# Patient Record
Sex: FEMALE | Race: WHITE
[De-identification: names, ages, dates, MRNs, and addresses within clinical notes are randomized per-mention and may not be internally consistent; named-entity substitution may affect disease eponyms.]

---

## 2018-11-12 ENCOUNTER — HOSPITAL ENCOUNTER (EMERGENCY)
Dept: HOSPITAL 59 - ER | Age: 43
Discharge: HOME | End: 2018-11-12
Payer: COMMERCIAL

## 2018-11-12 DIAGNOSIS — I10: ICD-10-CM

## 2018-11-12 DIAGNOSIS — M62.830: Primary | ICD-10-CM

## 2018-11-12 DIAGNOSIS — Z87.442: ICD-10-CM

## 2018-11-12 DIAGNOSIS — Z87.891: ICD-10-CM

## 2018-11-12 LAB
ANION GAP SERPL CALC-SCNC: 14 MMOL/L (ref 7–16)
APPEARANCE UR: CLEAR
BACTERIA #/AREA URNS HPF: (no result) /[HPF]
BASOPHILS NFR BLD: 0.2 % (ref 0–6)
BILIRUB UR-MCNC: NEGATIVE MG/DL
BUN SERPL-MCNC: 14 MG/DL (ref 6–20)
CO2 SERPL-SCNC: 24 MMOL/L (ref 22–29)
COLOR UR: YELLOW
CREAT SERPL-MCNC: 0.7 MG/DL (ref 0.5–0.9)
EOSINOPHIL NFR BLD: 2.2 % (ref 0–6)
EPI CELLS #/AREA URNS HPF: >50 /[HPF]
ERYTHROCYTE [DISTWIDTH] IN BLOOD BY AUTOMATED COUNT: 13.6 % (ref 11.5–14.5)
EST GLOMERULAR FILTRATION RATE: > 60 ML/MIN
GLUCOSE SERPL-MCNC: 103 MG/DL (ref 74–109)
GLUCOSE UR STRIP-MCNC: NEGATIVE MG/DL
GRANULOCYTES NFR BLD: 75.8 % (ref 47–80)
HCG,QUALITATIVE URINE: NEGATIVE
HCT VFR BLD CALC: 41.3 % (ref 35–47)
HGB BLD-MCNC: 13.8 GM/DL (ref 11.6–16)
KETONES UR QL STRIP: NEGATIVE
LYMPHOCYTES NFR BLD AUTO: 16.3 % (ref 16–45)
MCH RBC QN AUTO: 29.4 PG (ref 27–33)
MCHC RBC AUTO-ENTMCNC: 33.4 G/DL (ref 32–36)
MCV RBC AUTO: 88.1 FL (ref 81–97)
MONOCYTES NFR BLD: 5.5 % (ref 0–9)
NITRITE UR QL STRIP: NEGATIVE
PLATELET # BLD: 280 K/UL (ref 130–400)
PMV BLD AUTO: 10.6 FL (ref 7.4–10.4)
PROT UR QL STRIP: (no result)
RBC # BLD AUTO: 4.69 M/UL (ref 3.8–5.4)
RBC # UR STRIP: (no result) /UL
RBC #/AREA URNS HPF: (no result) /[HPF]
URINE LEUKOCYTE ESTERASE: (no result)
UROBILINOGEN UR STRIP-ACNC: 0.2 E.U./DL (ref 0.2–1)
WBC # BLD AUTO: 8.7 K/UL (ref 4.2–12.2)
WBC #/AREA URNS HPF: (no result) /[HPF]

## 2018-11-12 PROCEDURE — 81025 URINE PREGNANCY TEST: CPT

## 2018-11-12 PROCEDURE — 99283 EMERGENCY DEPT VISIT LOW MDM: CPT

## 2018-11-12 PROCEDURE — 96372 THER/PROPH/DIAG INJ SC/IM: CPT

## 2018-11-12 PROCEDURE — 74176 CT ABD & PELVIS W/O CONTRAST: CPT

## 2018-11-12 PROCEDURE — 85025 COMPLETE CBC W/AUTO DIFF WBC: CPT

## 2018-11-12 PROCEDURE — 99284 EMERGENCY DEPT VISIT MOD MDM: CPT

## 2018-11-12 PROCEDURE — 80048 BASIC METABOLIC PNL TOTAL CA: CPT

## 2018-11-12 PROCEDURE — 81001 URINALYSIS AUTO W/SCOPE: CPT

## 2018-11-12 NOTE — EMERGENCY DEPARTMENT RECORD
History of Present Illness





- General


Chief Complaint: Back Pain/Injury


Stated Complaint: BACK SPASMS


Time Seen by Provider: 18 07:23


Source: Patient


Mode of Arrival: Ambulatory


Limitations: No limitations





- History of Present Illness


Initial Comments: 


The patient is here due to a one week hx of L flank pain and spasms. The pain 

was mild for the first 6 days but got a lot worse yesterday. The pain is 

located in the L mid back at the lower thoracic area. It becomes much worse 

with any twisting or bending. There is no radiation of the pain down the legs 

and no leg weakness, numbness, bowel or bladder issues, AP, fever, or 

hematuria. The patient has had similar issues in the past but not as bad.





MD Complaint: Back pain


Onset/Timin


-: Week(s)


Similar Symptoms Previously: Yes


Place: Home


Radiation: None


Severity: Severe


Severity scale (1-10): 10


Quality: Sharp


Consistency: Constant


Improves With: None


Worsens With: Supine


Context: Unknown


Associated Symptoms: Denies other symptoms


Treatments Prior to Arrival: NSAIDS





- Related Data


 Home Medications











 Medication  Instructions  Recorded  Confirmed  Last Taken


 


Gabapentin [Neurontin] 100 mg PO QHS 18 Unknown


 


Hydrochlorothiazide [Hctz 12.5MG] 12.5 mg PO DAILY 18 Unknown


 


Levothyroxine Sodium [Synthroid] 125 mcg PO DAILY 18 Unknown








 Previous Rx's











 Medication  Instructions  Recorded


 


Cyclobenzaprine HCl [Flexeril] 10 mg PO TID PRN #20 tablet 18


 


Naproxen [Naprosyn] 500 mg PO BID #14 tablet. 18











 Allergies











Allergy/AdvReac Type Severity Reaction Status Date / Time


 


No Known Drug Allergies Allergy   Verified 18 07:25














Travel Screening





- Travel/Exposure Within Last 30 Days


Have you traveled within the last 30 days?: No





Review of Systems


Constitutional: Denies: Chills, Fever


Eyes: Denies: Eye discharge


ENT: Denies: Congestion


Respiratory: Denies: Cough, Dyspnea





Past Medical History





- SOCIAL HISTORY


Smoking Status: Former smoker


Alcohol Use: None


Drug Use: None





- RESPIRATORY


Hx Respiratory Disorders: No





- CARDIOVASCULAR


Hx Cardio Disorders: No





- NEURO


Hx Neuro Disorders: No





- GI


Hx GI Disorders: No





- 


Hx Genitourinary Disorders: Yes


Hx Kidney Stones: Yes





- ENDOCRINE


Hx Endocrine Disorders: Yes


Hx Thyroid Disease: Yes





- PSYCH


Hx Psych Problems: No





Family Medical History


Any Significant Family History?: No





Physical Exam





- General


General Appearance: Alert, Oriented x3, Cooperative, No acute distress





- Head


Head exam: Atraumatic, Normocephalic, Normal inspection





- Eye


Eye exam: Normal appearance, PERRL





- Neck


Neck exam: Normal inspection, Full ROM.  negative: Tenderness





- Respiratory


Respiratory exam: Normal lung sounds bilaterally.  negative: Respiratory 

distress





- Cardiovascular


Cardiovascular Exam: Regular rate, Normal rhythm, Normal heart sounds





- GI/Abdominal


GI/Abdominal exam: Soft, Normal bowel sounds.  negative: Tenderness





- Extremities


Extremities exam: Normal inspection, Full ROM, Normal capillary refill.  

negative: Tenderness





- Back


Back exam: Reports: Normal inspection, Muscle spasm, Paraspinal tenderness.  

Denies: Vertebral tenderness


Image of Body Front/Back: 


  __________________________














  __________________________





 1 - Area of pain and tenderness.








- Neurological


Neurological exam: Alert, Normal gait, Oriented X3.  negative: Abnormal gait, 

Altered, Motor sensory deficit





- Skin


Skin exam: negative: Rash





Course





 Vital Signs











  18





  07:19


 


Temperature 97.4 F L


 


Pulse Rate 103 H


 


Respiratory 18





Rate 


 


Blood Pressure 156/101


 


Pulse Ox 97














- Reevaluation(s)


Reevaluation #1: 


The patient is doing much better at this time. She is ready for home and will 

rest when possible. I did discuss the lab and CT results with her.


18 09:38








Medical Decision Making





- Data Complexity


MDM Data: Labs Ordered and/or Reviewed, X-Ray Ordered and/or Reviewed





- Lab Data


Result diagrams: 


 18 07:43





 18 07:43





- Radiology Data


Radiology results: Report reviewed (CT: Neg for stones or hydro.)





Disposition


Disposition: Discharge


Clinical Impression: 


 Muscle spasm





Disposition: Home, Self-Care


Condition: (2) Stable


Instructions:  Muscle Spasm (ED)


Additional Instructions: 


Please take the Norco and Flexeril as needed and also the Norco when not 

driving or working. Please see your family doctor for recheck in 2-3 days if 

not better and return to the ER for any worsening symptoms.


Prescriptions: 


Cyclobenzaprine HCl [Flexeril] 10 mg PO TID PRN #20 tablet


 PRN Reason: Pain


Naproxen [Naprosyn] 500 mg PO BID #14 tablet.dr


Forms:  Patient Portal Access


Time of Disposition: 09:40





Quality





- Quality Measures


Quality Measures: N/A





- Blood Pressure Screening


View Details: Yes


Does Patient Have Any of the Following: Active Dx of HTN


Blood Pressure Classification: Hypertensive Reading


Systolic Measurement: 156


Diastolic Measurement: 101


Screening for High Blood Pressure: Patient Exclusion, Hx of HTN []

## 2018-11-13 NOTE — CT SCAN REPORT
EXAM:  CT OF THE ABDOMEN AND PELVIS WITHOUT CONTRAST 



HISTORY:  LEFT FLANK PAIN.



TECHNIQUE:  Sequential axial images were obtained from the diaphragms through 
the ischiorectal fossa without intravenous or oral contrast administration.  



FINDINGS:  The visualized lung bases appear normal.  The nonopacified liver 
appears normal.  The gallbladder has been surgically removed.  The pancreas and 
spleen appear normal.  The adrenal glands appear normal.  There are 
nonobstructing calculi in both kidneys.  No CT findings suggestive of 
obstructive uropathy.  The urinary bladder appears grossly unremarkable.  There 
is a bulky appearance to the uterus likely representative of fibroid change.  
The small bowel appears normal.  The appendix is visualized and appears normal.
  The colon appears normal.  The osseous structures are normal.  



IMPRESSION:  

1.  NO CT FINDINGS SUGGESTIVE OF OBSTRUCTIVE UROPATHY.  THERE ARE SMALL 
NONOBSTRUCTING CALCULI IN BOTH KIDNEYS.  



2.  STATUS POST CHOLECYSTECTOMY SURGICAL CHANGE.  



JOB NUMBER:  [id number]
[facility]